# Patient Record
Sex: FEMALE
[De-identification: names, ages, dates, MRNs, and addresses within clinical notes are randomized per-mention and may not be internally consistent; named-entity substitution may affect disease eponyms.]

---

## 2023-08-21 PROBLEM — Z00.00 ENCOUNTER FOR PREVENTIVE HEALTH EXAMINATION: Status: ACTIVE | Noted: 2023-08-21

## 2023-08-24 ENCOUNTER — APPOINTMENT (OUTPATIENT)
Dept: OTOLARYNGOLOGY | Facility: CLINIC | Age: 73
End: 2023-08-24
Payer: MEDICARE

## 2023-08-24 VITALS
BODY MASS INDEX: 18.12 KG/M2 | WEIGHT: 96 LBS | OXYGEN SATURATION: 99 % | TEMPERATURE: 98 F | SYSTOLIC BLOOD PRESSURE: 126 MMHG | DIASTOLIC BLOOD PRESSURE: 66 MMHG | HEIGHT: 61 IN | HEART RATE: 60 BPM

## 2023-08-24 DIAGNOSIS — Z87.39 PERSONAL HISTORY OF OTHER DISEASES OF THE MUSCULOSKELETAL SYSTEM AND CONNECTIVE TISSUE: ICD-10-CM

## 2023-08-24 DIAGNOSIS — Z78.9 OTHER SPECIFIED HEALTH STATUS: ICD-10-CM

## 2023-08-24 DIAGNOSIS — Z86.79 PERSONAL HISTORY OF OTHER DISEASES OF THE CIRCULATORY SYSTEM: ICD-10-CM

## 2023-08-24 DIAGNOSIS — Z82.49 FAMILY HISTORY OF ISCHEMIC HEART DISEASE AND OTHER DISEASES OF THE CIRCULATORY SYSTEM: ICD-10-CM

## 2023-08-24 PROCEDURE — 99203 OFFICE O/P NEW LOW 30 MIN: CPT

## 2023-08-24 RX ORDER — ASPIRIN 81 MG
TABLET,CHEWABLE ORAL
Refills: 0 | Status: ACTIVE | COMMUNITY

## 2023-08-24 RX ORDER — AMLODIPINE BESYLATE 5 MG/1
5 TABLET ORAL
Refills: 0 | Status: ACTIVE | COMMUNITY

## 2023-08-24 RX ORDER — ROSUVASTATIN CALCIUM 10 MG/1
10 TABLET, FILM COATED ORAL
Refills: 0 | Status: ACTIVE | COMMUNITY

## 2023-08-25 NOTE — ASSESSMENT
[FreeTextEntry1] : facial swelling, unclear cause -Dopplers nl -results reviewed with pt  -MRV head/neck nl  -MRA head/ neck nl  -CT head nl, CTA head, CTV neck, CTV neck revealed l upper lobe ground glass pulm nodule, CT chest recommended -results reviewed with pt - asked her to bring in a copy of CD with images for CT head -recommended CT chest for possible svc synd also to assess lung lesion -pt bent down for 30 seconds and her face became erythematous, after she stood up redness improved  RTC with CT to review findings; call sooner for any issues  discussed tmj- she said she does not have this

## 2023-08-25 NOTE — PHYSICAL EXAM
[Midline] : trachea located in midline position [Normal] : no neck adenopathy [de-identified] : b  [de-identified] : gait steady

## 2023-08-25 NOTE — HISTORY OF PRESENT ILLNESS
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 She reports it occurs more frequently when she bends down, and when she wakes up in the morning her face feels more swollen. It first occurred 2 months ago when she was brushing her teeth. She reports it occurs every day and when her face gets red it can occur all over her face. It is not painful. When it occurs she gets intermittent head fullness. She denies nasal congestion or h/o allergies. She has had vascular workup with vascular surgeon, Dopplers, CTA/ CTV and was told everything was nl. She has a pacemaker. She had recent bloodwork which was grossly nl, 24 hour urine pending. She has never had anything like this before. She denies recent uri/ COVID-19 infxn/ flu. She has no autoimmune conditions she is aware of. She saw a dermatologist today who was not sure what it was. No FH pertinent to cc. Nonsmoker.

## 2023-08-25 NOTE — DATA REVIEWED
[de-identified] : Dopplers nl -results reviewed with pt  [de-identified] : MRV head/neck nl  MRA head/ neck nl  CT head nl, CTA head, CTV neck, CTV neck revealed l upper lobe ground glass pulm nodule -results reviewed with pt

## 2023-09-05 ENCOUNTER — NON-APPOINTMENT (OUTPATIENT)
Age: 73
End: 2023-09-05

## 2023-09-13 ENCOUNTER — APPOINTMENT (OUTPATIENT)
Dept: OTOLARYNGOLOGY | Facility: CLINIC | Age: 73
End: 2023-09-13
Payer: MEDICARE

## 2023-09-13 VITALS
BODY MASS INDEX: 18.12 KG/M2 | WEIGHT: 96 LBS | DIASTOLIC BLOOD PRESSURE: 54 MMHG | TEMPERATURE: 98 F | OXYGEN SATURATION: 100 % | HEIGHT: 61 IN | SYSTOLIC BLOOD PRESSURE: 123 MMHG | HEART RATE: 61 BPM

## 2023-09-13 DIAGNOSIS — R22.0 LOCALIZED SWELLING, MASS AND LUMP, HEAD: ICD-10-CM

## 2023-09-13 PROCEDURE — 99213 OFFICE O/P EST LOW 20 MIN: CPT
